# Patient Record
Sex: FEMALE | Race: WHITE | NOT HISPANIC OR LATINO | Employment: OTHER | URBAN - METROPOLITAN AREA
[De-identification: names, ages, dates, MRNs, and addresses within clinical notes are randomized per-mention and may not be internally consistent; named-entity substitution may affect disease eponyms.]

---

## 2017-01-04 ENCOUNTER — PATIENT OUTREACH (OUTPATIENT)
Dept: HEALTH INFORMATION MANAGEMENT | Facility: OTHER | Age: 82
End: 2017-01-04

## 2017-01-04 NOTE — PROGRESS NOTES
Outbound call to patient on [facility] for CHF. Alerted on BP of 154/64    Weekly follow up call completed.    Denies any chest pain, cough, SOB more than usual    Reports swelling on feet and legs, nothing more than usual. Per Peggy, she has been up on her feet for a few days now. She reports being busy cooking, washing clothes, putting away Frank decors and explained that she is used to being busy and up on her feet all the time.     Talked about salt intake and this is not something she is watching as she states she does not want her food to be bland. Educated on the importance of adhering to low salt/no added salt diet and how salt affects BP and CHF exacerbation.    Educated on managing her time:    • Plan ahead so your tasks are spaced throughout the day. As you plan, keep in mind the times of day you tend to have the most energy.  • Do only one thing at a time. Finish one task before starting another.  • Assemble everything you need before you start a task. This cuts out unnecessary steps while you’re working.  • Think about what you really need to do. Be realistic about what you can get done in a day.    Patient verbalized understanding of all education provided.

## 2017-01-05 ENCOUNTER — APPOINTMENT (OUTPATIENT)
Dept: MEDICAL GROUP | Facility: MEDICAL CENTER | Age: 82
End: 2017-01-05
Payer: MEDICARE

## 2017-01-11 ENCOUNTER — PATIENT OUTREACH (OUTPATIENT)
Dept: HEALTH INFORMATION MANAGEMENT | Facility: OTHER | Age: 82
End: 2017-01-11

## 2017-01-11 NOTE — PROGRESS NOTES
Outbound call to patient for weekly follow up. Landline phone is busy, while unable to leave message on mobile phone . Will do a follow up call tomorrow.

## 2017-01-12 ENCOUNTER — PATIENT OUTREACH (OUTPATIENT)
Dept: HEALTH INFORMATION MANAGEMENT | Facility: OTHER | Age: 82
End: 2017-01-12

## 2017-01-12 NOTE — PROGRESS NOTES
Outbound call for Remote Home Monitoring COPD; weekly follow up     Reviewed with   :           O2 use: 2 lpm at night         SOB: denies         Cough: denies         Chest pain: denies         Swelling: minimal, gets better in the morning         Dizziness: denies         Weakness/Fatigue: denies         Reviewed Medications:   Patient is using medications as directed    Patient stated she has still been cleaning up form the holiday. She also reports adding less salt to diet when cooking but cannot totally take salt off her diet     Interventions/Education:     Instructed patient to balance rest and activities, educated on energy conservation tecnhniques      Patient verbalized understanding of all education/teaching  provided

## 2017-01-18 ENCOUNTER — PATIENT OUTREACH (OUTPATIENT)
Dept: HEALTH INFORMATION MANAGEMENT | Facility: OTHER | Age: 82
End: 2017-01-18

## 2017-01-19 ENCOUNTER — PATIENT OUTREACH (OUTPATIENT)
Dept: HEALTH INFORMATION MANAGEMENT | Facility: OTHER | Age: 82
End: 2017-01-19

## 2017-01-19 NOTE — PROGRESS NOTES
Outbound call to patient on [facility]HM for CHF    Alerted on BP of 158/70    Left VM to call RN Care Coordinator back    Will try to call tomorrow if no return calls

## 2017-01-23 RX ORDER — AMLODIPINE BESYLATE 10 MG/1
TABLET ORAL
Qty: 90 TAB | Refills: 1 | Status: SHIPPED | OUTPATIENT
Start: 2017-01-23 | End: 2017-05-12

## 2017-01-25 ENCOUNTER — PATIENT OUTREACH (OUTPATIENT)
Dept: HEALTH INFORMATION MANAGEMENT | Facility: OTHER | Age: 82
End: 2017-01-25

## 2017-01-25 NOTE — PROGRESS NOTES
Outbound call to patient on [facility] for CHF. Alerted on BP of 151/71. Patient states that she was having pain this morning when she checked her biometrics.    Denies any symptoms- no chest pain, no SOB, no dizziness, no weakness.    Educated Peggy on use of her pain med medication (Norco). She reports her pain is usually in the morning but feels better after taking her medication.    Educated on non pharmacologic pain relief methods such as use of heating pads, exercise, massage, distraction.    Reviewed upcoming appointments with patient.     Verbalized understanding of all education provided.

## 2017-01-26 ENCOUNTER — PATIENT OUTREACH (OUTPATIENT)
Dept: HEALTH INFORMATION MANAGEMENT | Facility: OTHER | Age: 82
End: 2017-01-26

## 2017-01-27 NOTE — PROGRESS NOTES
Outbound call to patient on [facility] for CHF. Alerted on BP of 171/65. Patient denies any symptoms. She reports drinking v8 tomato juice more than once daily this past week. She used to get the low salt one but asked a friend to buy for her and ended up getting one that is not low salt kind.    Reviewed low salt diet with patient and explained role of salt in HTN and CHF.    Reviewed CHF action plan.    Verbalized understanding of all education provided

## 2017-01-30 ENCOUNTER — PATIENT OUTREACH (OUTPATIENT)
Dept: HEALTH INFORMATION MANAGEMENT | Facility: OTHER | Age: 82
End: 2017-01-30

## 2017-01-30 NOTE — PROGRESS NOTES
Outbound call to patient on [facility] for CHF. Alerted on BP of 152/67. Denies any other symptoms.    She was short of breath when she answered the phone and reports that she has been working around the house and just got in from putting the garbage out.    Reminded patient on her appointment with pcp tomorrow. Will send BP trend to pcp.    Educated on balancing rest and activities, resting when tired.

## 2017-01-31 ENCOUNTER — HOSPITAL ENCOUNTER (OUTPATIENT)
Dept: LAB | Facility: MEDICAL CENTER | Age: 82
End: 2017-01-31
Attending: FAMILY MEDICINE
Payer: MEDICARE

## 2017-01-31 ENCOUNTER — OFFICE VISIT (OUTPATIENT)
Dept: MEDICAL GROUP | Facility: MEDICAL CENTER | Age: 82
End: 2017-01-31
Payer: MEDICARE

## 2017-01-31 VITALS
HEIGHT: 66 IN | HEART RATE: 70 BPM | TEMPERATURE: 98.6 F | RESPIRATION RATE: 14 BRPM | DIASTOLIC BLOOD PRESSURE: 68 MMHG | SYSTOLIC BLOOD PRESSURE: 122 MMHG | BODY MASS INDEX: 20.41 KG/M2 | WEIGHT: 127 LBS | OXYGEN SATURATION: 95 %

## 2017-01-31 DIAGNOSIS — R60.0 BILATERAL LEG EDEMA: ICD-10-CM

## 2017-01-31 DIAGNOSIS — I50.22 CHRONIC SYSTOLIC CONGESTIVE HEART FAILURE (HCC): ICD-10-CM

## 2017-01-31 DIAGNOSIS — M47.819 OSTEOARTHRITIS OF SPINE WITHOUT MYELOPATHY OR RADICULOPATHY, UNSPECIFIED SPINAL REGION: ICD-10-CM

## 2017-01-31 DIAGNOSIS — I10 ESSENTIAL HYPERTENSION: ICD-10-CM

## 2017-01-31 DIAGNOSIS — E03.9 HYPOTHYROIDISM, UNSPECIFIED TYPE: ICD-10-CM

## 2017-01-31 LAB
ANION GAP SERPL CALC-SCNC: 7 MMOL/L (ref 0–11.9)
BUN SERPL-MCNC: 21 MG/DL (ref 8–22)
CALCIUM SERPL-MCNC: 9.2 MG/DL (ref 8.5–10.5)
CHLORIDE SERPL-SCNC: 103 MMOL/L (ref 96–112)
CO2 SERPL-SCNC: 28 MMOL/L (ref 20–33)
CREAT SERPL-MCNC: 0.96 MG/DL (ref 0.5–1.4)
GLUCOSE SERPL-MCNC: 112 MG/DL (ref 65–99)
POTASSIUM SERPL-SCNC: 4.3 MMOL/L (ref 3.6–5.5)
SODIUM SERPL-SCNC: 138 MMOL/L (ref 135–145)
TSH SERPL DL<=0.005 MIU/L-ACNC: 15.03 UIU/ML (ref 0.3–3.7)

## 2017-01-31 PROCEDURE — G8432 DEP SCR NOT DOC, RNG: HCPCS | Performed by: FAMILY MEDICINE

## 2017-01-31 PROCEDURE — G8482 FLU IMMUNIZE ORDER/ADMIN: HCPCS | Performed by: FAMILY MEDICINE

## 2017-01-31 PROCEDURE — 84443 ASSAY THYROID STIM HORMONE: CPT

## 2017-01-31 PROCEDURE — 1101F PT FALLS ASSESS-DOCD LE1/YR: CPT | Performed by: FAMILY MEDICINE

## 2017-01-31 PROCEDURE — 36415 COLL VENOUS BLD VENIPUNCTURE: CPT

## 2017-01-31 PROCEDURE — 80048 BASIC METABOLIC PNL TOTAL CA: CPT

## 2017-01-31 PROCEDURE — 4040F PNEUMOC VAC/ADMIN/RCVD: CPT | Mod: 8P | Performed by: FAMILY MEDICINE

## 2017-01-31 PROCEDURE — 1036F TOBACCO NON-USER: CPT | Performed by: FAMILY MEDICINE

## 2017-01-31 PROCEDURE — G8419 CALC BMI OUT NRM PARAM NOF/U: HCPCS | Performed by: FAMILY MEDICINE

## 2017-01-31 PROCEDURE — 99214 OFFICE O/P EST MOD 30 MIN: CPT | Performed by: FAMILY MEDICINE

## 2017-01-31 NOTE — PROGRESS NOTES
cc:  Leg swelling    Subjective:     Peggy Richardson is a 89 y.o. female presenting to establish care:    1. Leg swelling: has been having bilateral leg swelling for years, has been stable.  Is usually better first thing in the morning.  Denies any orthopnea, sob, cough, weight gain.  She does eat a lot of soup, likely high in salt.  She has a dx of CHF in her chart, although there are no echocardiograms. Pt is unsure if she's ever had that done.  Is possibly on amlodipine 10mg daily, her home med list does not list it but in the chart, a recent prescription was sent on 1/23/2017.  Has hypothyroidism, on synthroid 112 daily, last TSH was 1.8 two years ago.  She reports having a right leg wound that was taking a long time to heal, has now scabbed over and seems to be improving. Is taking lasix 20mg daily with 8meq KCl daily.  Last bmp with creat 1.1 and gfr 46 on 7/2016.    2. HTN: is on lisinopril 7.5mg daily, atenolol 50mg BID, possibly amlodipine 10mg daily.  Denies any chest pain, shortness of breath, leg swelling, jaw claudication.  Occasionally gets lightheaded if she moves too quickly.    3. Arthritis: has arthritis in her back, neck, knees, hands.  Pain has been well controlled on mobic 7.5mg every morning with breakfast, and norco 7.5 TID.  Denies any constipation.    Review of systems:  See above.          Current outpatient prescriptions:   •  amlodipine (NORVASC) 10 MG Tab, TAKE 1 TABLET DAILY, Disp: 90 Tab, Rfl: 1  •  Potassium Chloride CR (MICRO-K) 8 MEQ Cap CR capsule, TAKE 1 CAPSULE BY MOUTH DAILY *GENERIC FOR MICRO-K*, Disp: 60 Cap, Rfl: 2  •  hydrocodone-acetaminophen (NORCO) 7.5-325 MG per tablet, Take 1 Tab by mouth every 8 hours as needed (severe pain)., Disp: 100 Tab, Rfl: 0  •  atenolol (TENORMIN) 50 MG Tab, TAKE 1 TABLET TWICE A DAY, Disp: 180 Tab, Rfl: 0  •  levothyroxine (SYNTHROID) 112 MCG Tab, TAKE 1 TABLET EVERY MORNING ON AN EMPTY STOMACH, Disp: 90 Tab, Rfl: 0  •  DETROL LA 4 MG CAPSULE SR  "24 HR, TAKE 1 CAPSULE DAILY, Disp: 90 Cap, Rfl: 0  •  meloxicam (MOBIC) 7.5 MG Tab, Take 1 Tab by mouth every day., Disp: 90 Tab, Rfl: 1  •  lisinopril (PRINIVIL) 20 MG Tab, Take 1 Tab by mouth every day., Disp: 90 Tab, Rfl: 1  •  Cyanocobalamin (B-12) 1000 MCG Cap, Take  by mouth., Disp: , Rfl:   •  furosemide (LASIX) 20 MG Tab, Take 1 Tab by mouth every day., Disp: 90 Tab, Rfl: 1  •  Calcium Carbonate-Vitamin D (CALTRATE 600+D) 600-400 MG-UNIT TABS, Take 1 Tab by mouth 2 times a day., Disp: 180 Each, Rfl: 1    Allergies   Allergen Reactions   • Fosamax      GI upset       Past Medical History   Diagnosis Date   • HTN (hypertension), benign    • Incontinence    • PVD (peripheral vascular disease) (CMS-[facility])    • Diabetes    • DJD (degenerative joint disease)    • GERD (gastroesophageal reflux disease)    • Medical home      Past Surgical History   Procedure Laterality Date   • Tonsillectomy     • Gastrectomy     • Gastrostomy tube change     • Gastrectomy       partial   • Cholecystectomy       Family History   Problem Relation Age of Onset   • Cancer Mother      Social History     Social History   • Marital Status:      Spouse Name: N/A   • Number of Children: N/A   • Years of Education: N/A     Occupational History   • Not on file.     Social History Main Topics   • Smoking status: Former Smoker -- 1.50 packs/day     Types: Cigarettes     Quit date: 07/21/1971   • Smokeless tobacco: Former User      Comment: 35 yrs ago   • Alcohol Use: No   • Drug Use: No   • Sexual Activity: Not Currently     Other Topics Concern   • Not on file     Social History Narrative       Objective:     Vitals: /68 mmHg  Pulse 70  Temp(Src) 37 °C (98.6 °F)  Resp 14  Ht 1.676 m (5' 6\")  Wt 57.607 kg (127 lb)  BMI 20.51 kg/m2  SpO2 95%  General: Alert, pleasant, NAD  HEENT: Normocephalic.  Neck supple.  No thyromegaly or masses palpated. No cervical or supraclavicular lymphadenopathy.  Heart: Regular rate and rhythm.  S1 " and S2 normal.  Grade 3/6 systolic and possibly early diastolic murmur  Respiratory: Normal respiratory effort.  Clear to auscultation bilaterally.    Abdomen: Non-distended, soft  Skin: Warm, dry, no rashes.  Musculoskeletal: Gait slow, uses a rolling walker  Extremities: bilateral pitting edema to knees.  Eschar on right lateral leg. No erythema.  Psych:  Affect/mood is normal, judgement is good, memory is intact, grooming is appropriate.    Assessment/Plan:     Peggy was seen today for hypertension.    Diagnoses and all orders for this visit:    Bilateral leg edema  Chronic systolic congestive heart failure (CMS-HCC)  CHF is listed in her chart but unable to find a recent echo.  Will order that.  Continue with lasix.  Advised to cut down on salt use.  If she is taking amlodipine, would recommend that she stop that.  Will also check tsh to make sure that is stable.  She will bring in all her medicine bottles at her next visit to review  -     ECHOCARDIOGRAM COMP W/O CONT; Future    Hypothyroidism, unspecified type  Possibly stable, continue synthroid for now, recheck tsh.  -     TSH; Future    Essential hypertension  Possibly over treating with amlodipine, lisinopril, lasix.  Will stop the amlodipine if she is actually taking that, she will bring in all her med bottles at next visit.  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  Will recheck kidney function, consider stopping nsaids.      Return in about 2 months (around 3/31/2017) for Med check.

## 2017-01-31 NOTE — MR AVS SNAPSHOT
"        Peggy Richardson   2017 2:20 PM   Office Visit   MRN: [id number]    Department:  [address] [place] [address]   Dept Phone:  [phone]    Description:  Female : 1921   Provider:  Dakotah Alcala M.D.           Reason for Visit     Hypertension           Allergies as of 2017     Allergen Noted Reactions    Fosamax 2012       GI upset      You were diagnosed with     Bilateral leg edema   [127244]       Chronic systolic congestive heart failure (CMS-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   [facility] (--)    [address] [place] [id number] [phone]              Problem List              ICD-10-CM Priority Class Noted - Resolved    Incontinence R32   Unknown - Present    DJD (degenerative joint disease) M19.90   Unknown - Present    PVD (peripheral vascular disease) (CMS-[facility]) I73.9   Unknown - Present    OSTEOPOROSIS    2009 - Present    Hypothyroidism E03.9   2010 - Present    Vitamin B12 deficiency E53.8   5/15/2012 - Present    Medical home Z78.9   2016 - Present    Essential hypertension I10   2017 - Present    Chronic systolic congestive heart failure (CMS-HCC) I50.22   2017 - Present    Bilateral leg edema R60.0   2017 - Present      Health Maintenance        Date Due Completion Dates    IMM " DTaP/Tdap/Td Vaccine (1 - Tdap) [birth year]/8/1940 ---    IMM ZOSTER VACCINE 12/8/1981 ---    IMM PNEUMOCOCCAL 65+ (ADULT) LOW/MEDIUM RISK SERIES (1 of 2 - PCV13) 12/8/1986 ---    BONE DENSITY 6/10/2019 6/10/2014 (Declined), 4/23/2014 (Postponed), 4/22/2008, 4/22/2008    Override on 6/10/2014: Patient Declined    Override on 4/23/2014: Postponed    COLONOSCOPY 12/11/2022 12/11/2012 (Declined)    Override on 12/11/2012: Patient Declined            Current Immunizations     Influenza Vaccine Adult HD 11/19/2016, 10/26/2015  4:28 PM, 10/15/2014, 9/24/2013    Influenza Vaccine Pediatric 12/21/2009    Pneumococcal Vaccine (UF)Historical Data 10/1/2007      Below and/or attached are the medications your provider expects you to take. Review all of your home medications and newly ordered medications with your provider and/or pharmacist. Follow medication instructions as directed by your provider and/or pharmacist. Please keep your medication list with you and share with your provider. Update the information when medications are discontinued, doses are changed, or new medications (including over-the-counter products) are added; and carry medication information at all times in the event of emergency situations     Allergies:  FOSAMAX - (reactions not documented)               Medications  Valid as of: January 31, 2017 -  3:29 PM    Generic Name Brand Name Tablet Size Instructions for use    AmLODIPine Besylate (Tab) NORVASC 10 MG TAKE 1 TABLET DAILY        Atenolol (Tab) TENORMIN 50 MG TAKE 1 TABLET TWICE A DAY        Calcium Carbonate-Vitamin D (Tab) Calcium Carbonate-Vitamin D 600-400 MG-UNIT Take 1 Tab by mouth 2 times a day.        Cyanocobalamin (Cap) B-12 1000 MCG Take  by mouth.        Furosemide (Tab) LASIX 20 MG Take 1 Tab by mouth every day.        Hydrocodone-Acetaminophen (Tab) NORCO 7.5-325 MG Take 1 Tab by mouth every 8 hours as needed (severe pain).        Levothyroxine Sodium (Tab) SYNTHROID 112 MCG TAKE 1 TABLET EVERY  MORNING ON AN EMPTY STOMACH        Lisinopril (Tab) PRINIVIL 20 MG Take 1 Tab by mouth every day.        Meloxicam (Tab) MOBIC 7.5 MG Take 1 Tab by mouth every day.        Potassium Chloride (Cap CR) MICRO-K 8 MEQ TAKE 1 CAPSULE BY MOUTH DAILY ***GENERIC FOR MICRO-K*        Tolterodine Tartrate (CAPSULE SR 24 HR) DETROL LA 4 MG TAKE 1 CAPSULE DAILY        .                 Medicines prescribed today were sent to:     [id number] [place] - [address] [facility]    [address] [place] [address] [place] [address]    Phone: [phone] Fax: [phone]    Open 24 Hours?: No      Medication refill instructions:       If your prescription bottle indicates you have medication refills left, it is not necessary to call your provider’s office. Please contact your pharmacy and they will refill your medication.    If your prescription bottle indicates you do not have any refills left, you may request refills at any time through one of the following ways: The online [employer] system (except Urgent Care), by calling your provider’s office, or by asking your pharmacy to contact your provider’s office with a refill request. Medication refills are processed only during regular business hours and may not be available until the next business day. Your provider may request additional information or to have a follow-up visit with you prior to refilling your medication.   *Please Note: Medication refills are assigned a new Rx number when refilled electronically. Your pharmacy may indicate that no refills were authorized even though a new prescription for the same medication is available at the pharmacy. Please request the medicine by name with the pharmacy before contacting your provider for a refill.        Your To Do List     Future Labs/Procedures Complete By Expires    BASIC METABOLIC PANEL  As directed 1/31/2018    ECHOCARDIOGRAM COMP W/O CONT  As directed 1/31/2018    TSH  As directed 1/31/2018      Instructions    Please bring all your  medication bottles at your next visit       Other Notes About Your Plan     Patient is enrolled in [facility] with Dr. Skinner.    7/19/16--Enrolled in Remote Telemonitoring Program for CHF           MyChart Status: Patient Declined

## 2017-02-01 DIAGNOSIS — E03.9 HYPOTHYROIDISM, UNSPECIFIED TYPE: ICD-10-CM

## 2017-02-01 NOTE — TELEPHONE ENCOUNTER
Can you call pt and let her know that her thyroid medication needs to be changed based on her blood work and that i need to know where to send the new prescription ([facility]?).  She also needs blood work in 2 months and an appointment.  thanks

## 2017-02-01 NOTE — TELEPHONE ENCOUNTER
[facility]  And she was informed about  Lab to be done in 2 months

## 2017-02-02 RX ORDER — LEVOTHYROXINE SODIUM 0.12 MG/1
125 TABLET ORAL
Qty: 90 TAB | Refills: 0 | Status: SHIPPED | OUTPATIENT
Start: 2017-02-02 | End: 2017-04-13 | Stop reason: SDUPTHER

## 2017-02-02 RX ORDER — LEVOTHYROXINE SODIUM 0.12 MG/1
125 TABLET ORAL
Qty: 90 TAB | Refills: 0 | Status: SHIPPED | OUTPATIENT
Start: 2017-02-02 | End: 2017-02-02 | Stop reason: SDUPTHER

## 2017-02-03 ENCOUNTER — PATIENT OUTREACH (OUTPATIENT)
Dept: HEALTH INFORMATION MANAGEMENT | Facility: OTHER | Age: 82
End: 2017-02-03

## 2017-02-09 ENCOUNTER — PATIENT OUTREACH (OUTPATIENT)
Dept: HEALTH INFORMATION MANAGEMENT | Facility: OTHER | Age: 82
End: 2017-02-09

## 2017-02-09 NOTE — PROGRESS NOTES
Outbound call to patient on [facility] for CHF, weekly follow up.Biometrics have been good in the past 9 days            Reviewed with  patient :           O2 use: 2 lpm via nc at night         SOB: denies         Cough: denies         Chest pain: denies         Swelling: on legs, nothing more than usual         Dizziness: denies         Weakness/Fatigue: denies         Reviewed Medications:   Patient is using medications as directed    Interventions/Education:     Reviewed changes in her medication  Reeducated on the importance of low salt intake. Recommended other options.  Will continue to monitor.      Patient verbalized understanding of all education/teaching  provided

## 2017-02-13 RX ORDER — FUROSEMIDE 20 MG/1
TABLET ORAL
OUTPATIENT
Start: 2017-02-13

## 2017-02-13 RX ORDER — FUROSEMIDE 20 MG/1
20 TABLET ORAL DAILY
Qty: 90 TAB | Refills: 0 | Status: SHIPPED | OUTPATIENT
Start: 2017-02-13

## 2017-02-16 ENCOUNTER — PATIENT OUTREACH (OUTPATIENT)
Dept: HEALTH INFORMATION MANAGEMENT | Facility: OTHER | Age: 82
End: 2017-02-16

## 2017-02-16 NOTE — PROGRESS NOTES
Outbound call to patient on [facility] for COPD. Alerted on weight gain of 3 lbs in a day. Patient states she did not gain weight but was just wearing heavier clothes. She states further that she has been weighing on her zachary scale and so far no changes. Emphasized the importance of checking weigh at consistent times,naked or wearing light clothe without shoes on and having the scale on a flat surface.Denies any other symptoms.    Patient reeducated on salt intake and she reports that for the first time last night she did not add any salt to her food and she thought it tasted fine. Emphasized the role of salt on HTN and CHF.    Verbalized understanding.

## 2017-02-17 ENCOUNTER — PATIENT OUTREACH (OUTPATIENT)
Dept: HEALTH INFORMATION MANAGEMENT | Facility: OTHER | Age: 82
End: 2017-02-17

## 2017-02-17 NOTE — PROGRESS NOTES
REMOTE HOME TELE MONITORING FOR CHF  Alerted: on BP of 153/74    Reviewed biometrics and the BP is trending within patients normal ranges.  Refer to last “CC” progress note on 2/13/17

## 2017-02-24 ENCOUNTER — PATIENT OUTREACH (OUTPATIENT)
Dept: HEALTH INFORMATION MANAGEMENT | Facility: OTHER | Age: 82
End: 2017-02-24

## 2017-02-24 NOTE — PROGRESS NOTES
Outbound call to patient on [facility] for CHF. Alerted on BP of 163/73. Patient denies any symptoms. She reports having pain from arthritis causing her BP to be up. Reviewed pain management, options for non pharmacologic pain relief methods. Reviewed salt intake with patient, she reports this to be better but sometimes it is difficult as she loves cooking and is used to adding salt in food. Educated on the role of salt in HTN and CHF. Verbalized understanding of education provided.

## 2017-02-27 ENCOUNTER — PATIENT OUTREACH (OUTPATIENT)
Dept: HEALTH INFORMATION MANAGEMENT | Facility: OTHER | Age: 82
End: 2017-02-27

## 2017-02-27 NOTE — PROGRESS NOTES
REMOTE HOME TELE MONITORING FOR CHF  Alerted: on BP of 157/75    Reviewed biometrics and the BP is trending within patients normal ranges.

## 2017-03-01 ENCOUNTER — PATIENT OUTREACH (OUTPATIENT)
Dept: HEALTH INFORMATION MANAGEMENT | Facility: OTHER | Age: 82
End: 2017-03-01

## 2017-03-01 NOTE — PROGRESS NOTES
REMOTE HOME TELE MONITORING FOR CHF  Alerted: on BP of 151/78    Reviewed biometrics and the BP is trending within patients normal ranges.

## 2017-03-02 ENCOUNTER — PATIENT OUTREACH (OUTPATIENT)
Dept: HEALTH INFORMATION MANAGEMENT | Facility: OTHER | Age: 82
End: 2017-03-02

## 2017-03-02 NOTE — PROGRESS NOTES
Outbound call to patient on [facility] for CHF. Alerted on BP of 171/75. Patient reports feeling dizzy, weak  and tired the past week and she thinks it was due to her increased levothyroxine. Yesterday she started taking the old, lower dose (112) and reports feeling better today  Patient reports having nasal congestion for a few days now, started using her nasal sprays which she reports to be working. She also increased her oxygen to 3 lpm from 2 lpm    Patient reports less swelling on feet and legs. She denies any chest pain, cough, shortness of breath. Recommended patient balance rest and activities.    IB message sent to PCP.    Will continue to monitor

## 2017-03-07 ENCOUNTER — PATIENT OUTREACH (OUTPATIENT)
Dept: HEALTH INFORMATION MANAGEMENT | Facility: OTHER | Age: 82
End: 2017-03-07

## 2017-03-07 DIAGNOSIS — E03.9 HYPOTHYROIDISM, UNSPECIFIED TYPE: ICD-10-CM

## 2017-03-07 DIAGNOSIS — I10 ESSENTIAL HYPERTENSION: ICD-10-CM

## 2017-03-07 DIAGNOSIS — R60.0 BILATERAL LEG EDEMA: ICD-10-CM

## 2017-03-07 NOTE — PROGRESS NOTES
Outbound call to patient on [facility] for CHF, weekly follow up call.  Patient reports that she was admitted to [facility] over the weekend due to a fall    Patient reported that he slipped in the bathroom as she was not wearing her slippers. She landed on the bathtub, pulled the curtain which fell on her. She reports no injury found on xray. [facility] home health was ordered.    Patient educated on fall prevention and safety precaution. Recommended using a bedside  Commode.    Verbalized understanding.    Will continue to monitor

## 2017-03-08 ENCOUNTER — PATIENT OUTREACH (OUTPATIENT)
Dept: HEALTH INFORMATION MANAGEMENT | Facility: OTHER | Age: 82
End: 2017-03-08

## 2017-03-08 NOTE — PROGRESS NOTES
REMOTE HOME TELE MONITORING FOR COPD  Alerted: on BP of 158/66    Reviewed biometrics and the BP is trending within patients normal ranges.  Refer to last “CC” progress note on 3/7/17

## 2017-03-09 ENCOUNTER — PATIENT OUTREACH (OUTPATIENT)
Dept: HEALTH INFORMATION MANAGEMENT | Facility: OTHER | Age: 82
End: 2017-03-09

## 2017-03-09 NOTE — PROGRESS NOTES
REMOTE HOME TELE MONITORING FOR COPD  Alerted: on BP of 151/62    Reviewed biometrics and the BP is trending within patients normal ranges.

## 2017-03-10 ENCOUNTER — OFFICE VISIT (OUTPATIENT)
Dept: MEDICAL GROUP | Facility: MEDICAL CENTER | Age: 82
End: 2017-03-10
Payer: MEDICARE

## 2017-03-10 VITALS
HEART RATE: 69 BPM | SYSTOLIC BLOOD PRESSURE: 128 MMHG | WEIGHT: 127 LBS | HEIGHT: 66 IN | TEMPERATURE: 97.8 F | OXYGEN SATURATION: 98 % | RESPIRATION RATE: 16 BRPM | DIASTOLIC BLOOD PRESSURE: 74 MMHG | BODY MASS INDEX: 20.41 KG/M2

## 2017-03-10 DIAGNOSIS — E03.9 HYPOTHYROIDISM, UNSPECIFIED TYPE: ICD-10-CM

## 2017-03-10 DIAGNOSIS — I10 ESSENTIAL HYPERTENSION: ICD-10-CM

## 2017-03-10 DIAGNOSIS — E87.6 HYPOKALEMIA: ICD-10-CM
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
## 2017-03-10 DIAGNOSIS — E53.8 VITAMIN B12 DEFICIENCY: ICD-10-CM
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
## 2017-03-10 DIAGNOSIS — M47.819 OSTEOARTHRITIS OF SPINE WITHOUT MYELOPATHY OR RADICULOPATHY, UNSPECIFIED SPINAL REGION: ICD-10-CM

## 2017-03-10 PROCEDURE — 1036F TOBACCO NON-USER: CPT | Performed by: NURSE PRACTITIONER

## 2017-03-10 PROCEDURE — G8482 FLU IMMUNIZE ORDER/ADMIN: HCPCS | Performed by: NURSE PRACTITIONER

## 2017-03-10 PROCEDURE — G8419 CALC BMI OUT NRM PARAM NOF/U: HCPCS | Performed by: NURSE PRACTITIONER

## 2017-03-10 PROCEDURE — 1101F PT FALLS ASSESS-DOCD LE1/YR: CPT | Performed by: NURSE PRACTITIONER

## 2017-03-10 PROCEDURE — 4040F PNEUMOC VAC/ADMIN/RCVD: CPT | Mod: 8P | Performed by: NURSE PRACTITIONER

## 2017-03-10 PROCEDURE — 99214 OFFICE O/P EST MOD 30 MIN: CPT | Mod: 25 | Performed by: NURSE PRACTITIONER

## 2017-03-10 ASSESSMENT — ENCOUNTER SYMPTOMS: BACK PAIN: 1

## 2017-03-10 NOTE — PROGRESS NOTES
Subjective:      Peggy Richardson is a 89 y.o. female who presents with Hospital Follow-up            HPI Peggy Richardson is a patient of Dr. Skinner who just recently established with  here today for hospital follow-up.        1. Fall at home, subsequent encounter  Patient reports that about 5 days ago she was walking to her bathroom when she slipped and fell backwards into her bathtub. She went to [facility] emergency room and states she was admitted for 3 days. She reports that no fractures were found and no medication changes were made. We requested hospital records earlier today but they still were not available as of the time of the visit or dictation.    Patient states she believes she was kept because of the pain and was given pain management. She does take Norco 3-4 times a day for pain control. She states the pain has improved and as mentioned above, she is not on any new medications. She does not have any records with her. She states she does feel weak most of the time but does not normally fall at home. She denies dysuria, chest pain, shortness of breath or increasing lower extremity edema which she reports has improved since hospitalization.    2. Hypothyroidism, unspecified type  Patient has history of hypothyroidism and her dosage of levothyroxine was increased by  in January because of elevated TSH levels. Patient states she is taking her medication faithfully and will be due for lab work.    3. Hypokalemia  Patient on low-dose potassium supplementation and her last potassium level for this office was normal. Patient does report something about getting potassium supplementation in the hospital.    4. Vitamin B12 deficiency  Patient has history of B12 deficiency and is due for lab work.    5. Essential hypertension  Blood pressure is controlled on amlodipine and atenolol.    6. 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 She states she has neighbors who are on the 10 mg dosage and wonders if she can switch to this. She typically gets #100 pills per month.    Social History   Substance Use Topics   • Smoking status: Former Smoker -- 1.50 packs/day     Types: Cigarettes     Quit date: 07/21/1971   • Smokeless tobacco: Former User      Comment: 35 yrs ago   • Alcohol Use: No       Family History   Problem Relation Age of Onset   • Cancer Mother      Past Medical History   Diagnosis Date   • HTN (hypertension), benign    • Incontinence    • PVD (peripheral vascular disease) (CMS-[facility])    • Diabetes    • DJD (degenerative joint disease)    • GERD (gastroesophageal reflux disease)    • Medical home        Review of Systems   Constitutional: Positive for malaise/fatigue.   Musculoskeletal: Positive for back pain and joint pain.   All other systems reviewed and are negative.         Objective:     /74 mmHg  Pulse 69  Temp(Src) 36.6 °C (97.8 °F)  Resp 16  Ht 1.676 m (5' 5.98\")  Wt 57.607 kg (127 lb)  BMI 20.51 kg/m2  SpO2 98%     Physical Exam   Constitutional: She is oriented to person, place, and time. She appears well-developed and well-nourished. No distress.   HENT:   Head: Normocephalic and atraumatic.   Right Ear: External ear normal.   Left Ear: External ear normal.   Nose: Nose normal.   Eyes: Right eye exhibits no discharge. Left eye exhibits no discharge.   Neck: Normal range of motion. Neck supple. No thyromegaly present.   Cardiovascular: Normal rate and regular rhythm.  Exam reveals no gallop and no friction rub.    Murmur heard.  Pulmonary/Chest: Effort normal and breath sounds normal. She has no wheezes. She has no rales.   Musculoskeletal: She exhibits no edema or tenderness.   Generalized weakness and she uses a walker for mobility and wheelchair for longer distance mobility.   Neurological: She is alert and oriented to person, place, and time. She displays normal reflexes.   Skin: " Skin is warm and dry. No rash noted. She is not diaphoretic.   Psychiatric: She has a normal mood and affect. Her behavior is normal. Judgment and thought content normal.   Nursing note and vitals reviewed.              Assessment/Plan:     1. Fall at home, subsequent encounter  We have requested records from [facility] but they were not available at the time of dictation. Patient apparently did not suffer a fracture and she brings with her her medications today which showed no changes from what she receives from this office. I spoke with her about safety protocol and she does have family members that help. She has a walker to use. She is wanting higher dose of narcotics but I declined changes today.    2. Hypothyroidism, unspecified type  Patient's TSH was high prior to her change in medication. I have given her TSH slip to do lab work before her next visit with her PCP.  - TSH; Future    3. Hypokalemia  She will do lab work to check if she has electrolyte imbalances and is currently on low-dose potassium.  - COMP METABOLIC PANEL; Future    4. Vitamin B12 deficiency  Patient has history of B12 deficiency.  - VITAMIN B12; Future    5. Essential hypertension  She shows no hypotension in the office and continues with her amlodipine and atenolol. She is also on Lasix and her lower extremity edema has improved post hospitalization.    6. 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

## 2017-03-13 ENCOUNTER — PATIENT OUTREACH (OUTPATIENT)
Dept: HEALTH INFORMATION MANAGEMENT | Facility: OTHER | Age: 82
End: 2017-03-13

## 2017-03-14 ENCOUNTER — PATIENT OUTREACH (OUTPATIENT)
Dept: HEALTH INFORMATION MANAGEMENT | Facility: OTHER | Age: 82
End: 2017-03-14

## 2017-03-14 RX ORDER — TOLTERODINE TARTRATE 4 MG
CAPSULE, EXT RELEASE 24 HR ORAL
Qty: 90 CAP | Refills: 1 | Status: SHIPPED | OUTPATIENT
Start: 2017-03-14

## 2017-03-14 NOTE — PROGRESS NOTES
"Care Coordination:    Outbound call to patient     RHM alert: weight increase    Pt states \"Yeah I've been trying!\"  \"I got down to 113 while I was in the hospital\".  \"I've been eating, I like to eat\".    Pt states she has mild feet and ankle swelling but it is greatly reduced compared to prior.     Pt states her toenails have gotten quite long and it makes it painful to walk, pt states she will schedule an appt with her podiatrist.    Pt states her pain from recent fall is \"about 60% gone\".    Plan:    Continue to monitor RHM  Pts primary CC RN to follow      "

## 2017-03-16 ENCOUNTER — PATIENT OUTREACH (OUTPATIENT)
Dept: HEALTH INFORMATION MANAGEMENT | Facility: OTHER | Age: 82
End: 2017-03-16

## 2017-03-16 NOTE — PROGRESS NOTES
Outbound call to patient on RHM for CHF. Alerted on weight gain  Of 2 lbs. Patient reports trying to gain weight since she lost weight in the hospital. Educated on low salt low food diet. Patient states she has been eating canned foods and gravies, and vegetables. Explained that gravy has high salt intake as well as canned goods. Educated on how to read the labels.    Denies any chest pains, shortness of breath. She reports swelling and feet and legs due to being on her feet for a while. Recommended resting and elevating legs to improve swelling.    Will continue to monitor.

## 2017-03-20 RX ORDER — PIOGLITAZONEHYDROCHLORIDE 15 MG/1
TABLET ORAL
Qty: 90 TAB | Refills: 1 | Status: SHIPPED | OUTPATIENT
Start: 2017-03-20 | End: 2017-05-12

## 2017-03-20 RX ORDER — ATENOLOL 50 MG/1
TABLET ORAL
Qty: 180 TAB | Refills: 1 | Status: SHIPPED | OUTPATIENT
Start: 2017-03-20 | End: 2017-05-12

## 2017-03-21 ENCOUNTER — PATIENT OUTREACH (OUTPATIENT)
Dept: HEALTH INFORMATION MANAGEMENT | Facility: OTHER | Age: 82
End: 2017-03-21

## 2017-03-21 NOTE — PROGRESS NOTES
Outbound call to patient on RHM for CHF. Alerted on question: Are you experiencing more difficulty breathing today, compared to a normal day? (With Educational Content) Yes    Patient reports feeling like she does not have the energy today. Denies any chest pain, shortness of breath, swelling on feet legs and hands    Reviewed medication and she is taking these appropriately    Instructed patient to balance rest and activities, rest when tired.    Patient knows when to go to the ER

## 2017-03-22 ENCOUNTER — PATIENT OUTREACH (OUTPATIENT)
Dept: HEALTH INFORMATION MANAGEMENT | Facility: OTHER | Age: 82
End: 2017-03-22

## 2017-03-22 NOTE — PROGRESS NOTES
Outbound call to patient on RHM for CHF. Alerted on weight gain of 2.5 lbs. Patient reports wearing heavier clothes today and eating a lot prior to checking her weight. Denies any chest pain, SOB more than usual or swelling more than usual.     Educated on the importance of weighing at same hours during the day, naked and placing her scale on a flat surface.    Reviewed salt intake, denied adding more salt to diet.    Will continue to monitor.

## 2017-03-27 ENCOUNTER — PATIENT OUTREACH (OUTPATIENT)
Dept: HEALTH INFORMATION MANAGEMENT | Facility: OTHER | Age: 82
End: 2017-03-27

## 2017-03-27 NOTE — PROGRESS NOTES
Outbound call to patient on RHM for CHF    Alerted on empty packet    Left VM to call RN Care Coordinator back    Will try to call 3/29/17 if no return calls

## 2017-03-28 ENCOUNTER — PATIENT OUTREACH (OUTPATIENT)
Dept: HEALTH INFORMATION MANAGEMENT | Facility: OTHER | Age: 82
End: 2017-03-28

## 2017-03-28 NOTE — PROGRESS NOTES
Outbound call to patient on RHM for CHF. Alerted on empty packet for 2 days now. Called and left message on home and mobile phones. Called [facility] and verified that patient is admitted there. Patient reports that she was admitted yesterday but could not remember what she went in for.    Will continue to monitor.

## 2017-04-03 ENCOUNTER — PATIENT OUTREACH (OUTPATIENT)
Dept: HEALTH INFORMATION MANAGEMENT | Facility: OTHER | Age: 82
End: 2017-04-03

## 2017-04-03 NOTE — PROGRESS NOTES
Outbound call to patient on RHM for CHF. Alerted on empty packet. Left VM for patient to call back. Called [facility] and patient is no longer there.Spoke with emergency contact Haleigh who reports that patient is currently at [facility].    [facility] placed on hold    Will continue to monitor

## 2017-04-12 ENCOUNTER — PATIENT OUTREACH (OUTPATIENT)
Dept: HEALTH INFORMATION MANAGEMENT | Facility: OTHER | Age: 82
End: 2017-04-12

## 2017-04-12 NOTE — PROGRESS NOTES
Outbound call to patient and left VM to call back. Called emergency contact and left  as well.  Called [facility] and [facility] is still there. Asked if someone can pack the equipment and send it back to [facility], patient states she does not have anyone. Patient states she might be discharging this Friday. Will continue to monitor.

## 2017-04-18 ENCOUNTER — TELEPHONE (OUTPATIENT)
Dept: MEDICAL GROUP | Facility: MEDICAL CENTER | Age: 82
End: 2017-04-18

## 2017-04-18 ENCOUNTER — PATIENT OUTREACH (OUTPATIENT)
Dept: HEALTH INFORMATION MANAGEMENT | Facility: OTHER | Age: 82
End: 2017-04-18

## 2017-04-18 NOTE — PROGRESS NOTES
Spoke with Peggy to review the following:    Patient was recently admitted to [facility] and was discharged from home Friday 4/14/17. Patient will be discharged from [facility] but will be kept on CCM program    Peggy reports that she is compliant with medications and diet.     Peggy will still need education on how to identify yellow and red symptoms and the necessary interventions and provided teach back on causes, symptoms, home care instructions, when to seek medical care versus immediate medical care for CHF management; coping with heart failure- ways to feel better, asking for help    Peggy will  be able to give examples of low sodium diet    Graduation Plan: Transition to a lower level monitoring with self-reporting to Dr. Alcala.    Confirmed that Peggy has the Care Coordinator's  contact information:  Rebecca [phone] and Dr Alcala contact number:  [phone].    Provided Peggy with the [facility] Nurse Hotline number:  [phone].  An RN is available 24-hours day, 7 days a week to answer medical and health questions you might have about your symptoms, medications, allergies or other conditions.    NORMAN notified Mary to contact the patient to provide instructions on returning the equipment used for the COPD Amb [facility] Home Remote Tele-monitoring program.

## 2017-04-24 ENCOUNTER — OFFICE VISIT (OUTPATIENT)
Dept: MEDICAL GROUP | Facility: MEDICAL CENTER | Age: 82
End: 2017-04-24
Payer: MEDICARE

## 2017-04-24 VITALS
BODY MASS INDEX: 21.16 KG/M2 | WEIGHT: 127 LBS | RESPIRATION RATE: 16 BRPM | DIASTOLIC BLOOD PRESSURE: 72 MMHG | SYSTOLIC BLOOD PRESSURE: 144 MMHG | HEART RATE: 67 BPM | TEMPERATURE: 98.2 F | OXYGEN SATURATION: 98 % | HEIGHT: 65 IN

## 2017-04-24 DIAGNOSIS — E03.4 HYPOTHYROIDISM DUE TO ACQUIRED ATROPHY OF THYROID: ICD-10-CM

## 2017-04-24 DIAGNOSIS — R60.0 BILATERAL LEG EDEMA: ICD-10-CM

## 2017-04-24 DIAGNOSIS — I10 ESSENTIAL HYPERTENSION: ICD-10-CM

## 2017-04-24 DIAGNOSIS — I50.22 CHRONIC SYSTOLIC CONGESTIVE HEART FAILURE (HCC): ICD-10-CM

## 2017-04-24 PROCEDURE — G8420 CALC BMI NORM PARAMETERS: HCPCS | Performed by: NURSE PRACTITIONER

## 2017-04-24 PROCEDURE — 4040F PNEUMOC VAC/ADMIN/RCVD: CPT | Mod: 8P | Performed by: NURSE PRACTITIONER

## 2017-04-24 PROCEDURE — 99213 OFFICE O/P EST LOW 20 MIN: CPT | Performed by: NURSE PRACTITIONER

## 2017-04-24 PROCEDURE — 1036F TOBACCO NON-USER: CPT | Performed by: NURSE PRACTITIONER

## 2017-04-24 PROCEDURE — 1101F PT FALLS ASSESS-DOCD LE1/YR: CPT | Performed by: NURSE PRACTITIONER

## 2017-04-24 PROCEDURE — G8432 DEP SCR NOT DOC, RNG: HCPCS | Performed by: NURSE PRACTITIONER

## 2017-04-24 NOTE — PROGRESS NOTES
Subjective:      Peggy Richardson is a 89 y.o. female who presents with Hospital Follow-up            HPI Peggy Richardson is a prior patient of Dr. Skinner here today for hospital follow-up?.      1. Chronic systolic congestive heart failure (CMS-HCC)  Patient reports that she recently got of rehabilitation 3 days ago and apparently was admitted there from [facility]. We do have notes from her hospitalization in March where she was seen for fatigue but we do not have any recent records. She states also that home health is going to see her but she does not know why she was in the hospital or what medication she is currently on. She complains of fatigue but no increase in swelling or shortness of breath. Pulse ox in the office is 98%. She is very hard of hearing.    2. Bilateral leg edema  She believes she is still taking her diuretic but she did not bring her medicines with her.    3. Hypothyroidism due to acquired atrophy of thyroid  Patient's last TSH from this office was elevated and levothyroxine dosage was increased. It is not clear if patient is taking this.    4. Essential hypertension  Blood pressure appears well controlled in the office on current medicines.      Social History   Substance Use Topics   • Smoking status: Former Smoker -- 1.50 packs/day     Types: Cigarettes     Quit date: 07/21/1971   • Smokeless tobacco: Former User      Comment: 35 yrs ago   • Alcohol Use: No     Past Medical History   Diagnosis Date   • HTN (hypertension), benign    • Incontinence    • PVD (peripheral vascular disease) (CMS-HCC)    • Diabetes    • DJD (degenerative joint disease)    • GERD (gastroesophageal reflux disease)    • Medical home      Family History   Problem Relation Age of Onset   • Cancer Mother        Review of Systems   Constitutional: Positive for malaise/fatigue.   Cardiovascular: Positive for leg swelling.   All other systems reviewed and are negative.         Objective:     /72 mmHg   "Pulse 67  Temp(Src) 36.8 °C (98.2 °F)  Resp 16  Ht 1.651 m (5' 5\")  Wt 57.607 kg (127 lb)  BMI 21.13 kg/m2  SpO2 98%     Physical Exam   Constitutional: She is oriented to person, place, and time. She appears well-developed and well-nourished. No distress.   HENT:   Head: Normocephalic and atraumatic.   Right Ear: External ear normal. Decreased hearing is noted.   Left Ear: External ear normal. Decreased hearing is noted.   Nose: Nose normal.   Eyes: Right eye exhibits no discharge. Left eye exhibits no discharge.   Neck: Normal range of motion. Neck supple. No thyromegaly present.   Cardiovascular: Normal rate and regular rhythm.  Exam reveals no gallop and no friction rub.    Murmur heard.  Pulmonary/Chest: Effort normal and breath sounds normal. She has no wheezes. She has no rales.   Musculoskeletal: She exhibits no edema or tenderness.   Neurological: She is alert and oriented to person, place, and time. She displays normal reflexes.   Skin: Skin is warm and dry. No rash noted. She is not diaphoretic.   Psychiatric: She has a normal mood and affect. Her behavior is normal. Judgment and thought content normal.   Nursing note and vitals reviewed.              Assessment/Plan:     1. Chronic systolic congestive heart failure (CMS-HCC)  It is unclear what patient's medications are currently and whether they were changed at her recent hospitalization. We did request records but nothing was received by time of visit. Patient does have home health coming out and I will have them do lab work on her until we get results. She is on Lasix and potassium as well as an ACE inhibitor.  - COMP METABOLIC PANEL; Future  - CBC WITHOUT DIFFERENTIAL; Future    2. Bilateral leg edema  No increased swelling since last seen and records show she is supposed to be on Lasix 20 mg daily with potassium.    3. Hypothyroidism due to acquired atrophy of thyroid  It is unclear whether patient is using the correct dosage of her " medication. We are trying to get records.  - TSH; Future    4. Essential hypertension  Blood pressure fairly well controlled on current medicines.

## 2017-04-24 NOTE — MR AVS SNAPSHOT
"        Peggy Richardson   2017 2:00 PM   Office Visit   MRN: [id number]    Department:  [address] [place] [address]   Dept Phone:  [phone]    Description:  Female : 1921   Provider:  RALF Augustine           Reason for Visit     Hospital Follow-up [facility]       Allergies as of 2017     Allergen Noted Reactions    Fosamax 2012       GI upset      You were diagnosed with     Chronic systolic congestive heart failure (CMS-HCC)   [526862]       Bilateral leg edema   [064507]       Hypothyroidism due to acquired atrophy of thyroid   [8405703]         Vital Signs     Blood Pressure Pulse Temperature Respirations Height Weight    144/72 mmHg 67 36.8 °C (98.2 °F) 16 1.651 m (5' 5\") 57.607 kg (127 lb)    Body Mass Index Oxygen Saturation Smoking Status             21.13 kg/m2 98% Former Smoker         Basic Information     Date Of Birth Sex Race Ethnicity Preferred Language    1921 Female White Non- English      Your appointments     May 17, 2017 10:00 AM   CARE MANAGEMENT OUTREACH with Ifrah Osborn R.N.   [facility] (--)    [address] [place] [id number]   [phone]           ***IMPORTANT**** This is a phone visit only. Do not come into the office. The Care Manager will call you at the scheduled time for Care Manager Telephone Visit.            May 19, 2017  3:20 PM   Established Patient with Dakotah Alcala M.D.   [facility] [address]    [address] [place] [id number] [phone]           You will be receiving a confirmation call a few days before your appointment from our automated call confirmation system.              Problem List              ICD-10-CM Priority Class Noted - Resolved    Incontinence R32   Unknown - Present    DJD (degenerative joint disease) M19.90   Unknown - Present    PVD (peripheral vascular disease) (CMS-HCC) I73.9   Unknown - Present    Vitamin B12 deficiency E53.8   5/15/2012 - Present   " Medical home Z78.9   2/5/2016 - Present    Essential hypertension I10   1/31/2017 - Present    Chronic systolic congestive heart failure (CMS-HCC) I50.22   1/31/2017 - Present    Bilateral leg edema R60.0   1/31/2017 - Present    Hypothyroidism due to acquired atrophy of thyroid E03.4   4/24/2017 - Present      Health Maintenance        Date Due Completion Dates    IMM DTaP/Tdap/Td Vaccine (1 - Tdap) 12/8/1940 ---    IMM ZOSTER VACCINE 12/8/1981 ---    IMM PNEUMOCOCCAL 65+ (ADULT) LOW/MEDIUM RISK SERIES (1 of 2 - PCV13) 12/8/1986 ---    BONE DENSITY 6/10/2019 6/10/2014 (Declined), 4/23/2014 (Postponed), 4/22/2008, 4/22/2008    Override on 6/10/2014: Patient Declined    Override on 4/23/2014: Postponed    COLONOSCOPY 12/11/2022 12/11/2012 (Declined)    Override on 12/11/2012: Patient Declined            Current Immunizations     Influenza Vaccine Adult HD 11/19/2016, 10/26/2015  4:28 PM, 10/15/2014, 9/24/2013    Influenza Vaccine Pediatric 12/21/2009    Pneumococcal Vaccine (UF)Historical Data 10/1/2007      Below and/or attached are the medications your provider expects you to take. Review all of your home medications and newly ordered medications with your provider and/or pharmacist. Follow medication instructions as directed by your provider and/or pharmacist. Please keep your medication list with you and share with your provider. Update the information when medications are discontinued, doses are changed, or new medications (including over-the-counter products) are added; and carry medication information at all times in the event of emergency situations     Allergies:  FOSAMAX - (reactions not documented)               Medications  Valid as of: April 24, 2017 -  2:25 PM    Generic Name Brand Name Tablet Size Instructions for use    AmLODIPine Besylate (Tab) NORVASC 10 MG TAKE 1 TABLET DAILY        Atenolol (Tab) TENORMIN 50 MG TAKE 1 TABLET TWICE A DAY        Calcium Carbonate-Vitamin D (Tab) Calcium  Carbonate-Vitamin D 600-400 MG-UNIT Take 1 Tab by mouth 2 times a day.        Cyanocobalamin (Cap) B-12 1000 MCG Take  by mouth.        Furosemide (Tab) LASIX 20 MG Take 1 Tab by mouth every day.        Hydrocodone-Acetaminophen (Tab) NORCO 7.5-325 MG Take 1 Tab by mouth every 8 hours as needed (severe pain).        Levothyroxine Sodium (Tab) SYNTHROID 125 MCG Take 1 Tab by mouth Every morning on an empty stomach. Indications: Underactive Thyroid        Lisinopril (Tab) PRINIVIL 20 MG Take 1 Tab by mouth every day.        Meloxicam (Tab) MOBIC 7.5 MG Take 1 Tab by mouth every day.        Pioglitazone HCl (Tab) ACTOS 15 MG TAKE 1 TABLET DAILY        Potassium Chloride (Cap CR) MICRO-K 8 MEQ TAKE 1 CAPSULE BY MOUTH DAILY ***GENERIC FOR MICRO-K*        Tolterodine Tartrate (CAPSULE SR 24 HR) DETROL LA 4 MG TAKE 1 CAPSULE DAILY        .                 Medicines prescribed today were sent to:     [id number] - [place], [place] - [address] [address]    Phone: [phone] Fax: [phone]    Open 24 Hours?: No    [facility] - [place] [address]    Phone: [phone] Fax: [phone]    Open 24 Hours?: No    [facility] - [place] [address]    Phone: [phone] Fax: [phone]    Open 24 Hours?: No      Medication refill instructions:       If your prescription bottle indicates you have medication refills left, it is not necessary to call your provider’s office. Please contact your pharmacy and they will refill your medication.    If your prescription bottle indicates you do not have any refills left, you may request refills at any time through one of the following ways: The online [employer] system (except Urgent Care), by calling your provider’s office, or by asking your pharmacy to contact your provider’s office with a refill  request. Medication refills are processed only during regular business hours and may not be available until the next business day. Your provider may request additional information or to have a follow-up visit with you prior to refilling your medication.   *Please Note: Medication refills are assigned a new Rx number when refilled electronically. Your pharmacy may indicate that no refills were authorized even though a new prescription for the same medication is available at the pharmacy. Please request the medicine by name with the pharmacy before contacting your provider for a refill.        Your To Do List     Future Labs/Procedures Complete By Expires    CBC WITHOUT DIFFERENTIAL  As directed 10/25/2017    COMP METABOLIC PANEL  As directed 4/25/2018    TSH  As directed 4/25/2018      Other Notes About Your Plan     Patient is enrolled in [facility] with Dr. Skinner.    7/19/16--Enrolled in Remote Telemonitoring Program for CHF           Kayliehart Status: Patient Declined

## 2017-04-25 ENCOUNTER — TELEPHONE (OUTPATIENT)
Dept: MEDICAL GROUP | Facility: MEDICAL CENTER | Age: 82
End: 2017-04-25

## 2017-04-25 NOTE — TELEPHONE ENCOUNTER
1. Caller Name: [facility]                                         Call Back Number: [phone]      Patient approves a detailed voicemail message: N\A    Mary called for the patient she stated that the patient gained 6 pounds over night using the [facility] scale and that the patient has shortness of breath with activities, I asked if the patient wanted to come in to see the doctor but she stated that the patient has no desire to be seen, Patient does have an up coming appointment on 5/19 ? Please advise

## 2017-04-26 LAB
ALBUMIN SERPL-MCNC: 2.9 G/DL (ref 3.2–4.6)
ALBUMIN/GLOB SERPL: 0.9 {RATIO} (ref 1.2–2.2)
ALP SERPL-CCNC: 90 IU/L (ref 39–117)
ALT SERPL-CCNC: 9 IU/L (ref 0–32)
AMBIG ABBREV CMP14 DFLT   977206: NORMAL
AST SERPL-CCNC: 18 IU/L (ref 0–40)
BASOPHILS # BLD AUTO: 0 X10E3/UL (ref 0–0.2)
BASOPHILS NFR BLD AUTO: 0 %
BILIRUB SERPL-MCNC: 0.4 MG/DL (ref 0–1.2)
BUN SERPL-MCNC: 18 MG/DL (ref 10–36)
BUN/CREAT SERPL: 18 (ref 12–28)
CALCIUM SERPL-MCNC: 8.4 MG/DL (ref 8.7–10.3)
CHLORIDE SERPL-SCNC: 105 MMOL/L (ref 96–106)
CO2 SERPL-SCNC: 25 MMOL/L (ref 18–29)
CREAT SERPL-MCNC: 1 MG/DL (ref 0.57–1)
EOSINOPHIL # BLD AUTO: 0.1 X10E3/UL (ref 0–0.4)
EOSINOPHIL NFR BLD AUTO: 2 %
ERYTHROCYTE [DISTWIDTH] IN BLOOD BY AUTOMATED COUNT: 16.1 % (ref 12.3–15.4)
GLOBULIN SER CALC-MCNC: 3.3 G/DL (ref 1.5–4.5)
GLUCOSE SERPL-MCNC: 110 MG/DL (ref 65–99)
HCT VFR BLD AUTO: 36 % (ref 34–46.6)
HGB BLD-MCNC: 11.8 G/DL (ref 11.1–15.9)
IMM GRANULOCYTES # BLD: 0 X10E3/UL (ref 0–0.1)
IMM GRANULOCYTES NFR BLD: 0 %
IMMATURE CELLS  115398: ABNORMAL
LYMPHOCYTES # BLD AUTO: 1.6 X10E3/UL (ref 0.7–3.1)
LYMPHOCYTES NFR BLD AUTO: 31 %
MCH RBC QN AUTO: 33.3 PG (ref 26.6–33)
MCHC RBC AUTO-ENTMCNC: 32.8 G/DL (ref 31.5–35.7)
MCV RBC AUTO: 102 FL (ref 79–97)
MONOCYTES # BLD AUTO: 0.4 X10E3/UL (ref 0.1–0.9)
MONOCYTES NFR BLD AUTO: 8 %
MORPHOLOGY BLD-IMP: ABNORMAL
NEUTROPHILS # BLD AUTO: 3 X10E3/UL (ref 1.4–7)
NEUTROPHILS NFR BLD AUTO: 59 %
NRBC BLD AUTO-RTO: ABNORMAL %
PLATELET # BLD AUTO: 209 X10E3/UL (ref 150–379)
POTASSIUM SERPL-SCNC: 4.5 MMOL/L (ref 3.5–5.2)
PROT SERPL-MCNC: 6.2 G/DL (ref 6–8.5)
RBC # BLD AUTO: 3.54 X10E6/UL (ref 3.77–5.28)
SODIUM SERPL-SCNC: 143 MMOL/L (ref 134–144)
TSH SERPL DL<=0.005 MIU/L-ACNC: 13.67 UIU/ML (ref 0.45–4.5)
WBC # BLD AUTO: 5.1 X10E3/UL (ref 3.4–10.8)

## 2017-04-26 NOTE — TELEPHONE ENCOUNTER
I spoke with Nicole from [facility] and she stated that the patient daughter is coming to take the patient to the ER.

## 2017-04-26 NOTE — TELEPHONE ENCOUNTER
[facility] called and stated that the patient was not feeling well and that they were going to have someone take the patient to UC, they also stated that the patient is taking Lasix? Please advise

## 2017-05-01 ENCOUNTER — TELEPHONE (OUTPATIENT)
Dept: MEDICAL GROUP | Facility: MEDICAL CENTER | Age: 82
End: 2017-05-01

## 2017-05-01 DIAGNOSIS — M54.50 CHRONIC MIDLINE LOW BACK PAIN WITHOUT SCIATICA: ICD-10-CM

## 2017-05-01 DIAGNOSIS — G89.29 CHRONIC MIDLINE LOW BACK PAIN WITHOUT SCIATICA: ICD-10-CM

## 2017-05-01 RX ORDER — HYDROCODONE BITARTRATE AND ACETAMINOPHEN 7.5; 325 MG/1; MG/1
1 TABLET ORAL EVERY 8 HOURS PRN
Qty: 100 TAB | Refills: 0 | Status: SHIPPED | OUTPATIENT
Start: 2017-05-01 | End: 2017-05-12 | Stop reason: SDUPTHER

## 2017-05-01 NOTE — TELEPHONE ENCOUNTER
1. Caller Name: [facility]                                            Call Back Number: [phone]        Patient approves a detailed voicemail message: N\A    aMry stated that the patient is on services with [facility], and will no longer require PT, Mary  Physical Therapist stated that the patient has been in and out of the hospital and was not able to fill her medication for hydrocodone-acetaminophen  is out of her pain medication, Mary stated that the prescription is out dated and wanted to know if a different prescription can be issued and if a family member can go by the office and  the medication for the patient? Please advise

## 2017-05-09 ENCOUNTER — TELEPHONE (OUTPATIENT)
Dept: MEDICAL GROUP | Facility: MEDICAL CENTER | Age: 82
End: 2017-05-09

## 2017-05-09 NOTE — TELEPHONE ENCOUNTER
1. Caller Name: Judith From [facility]                                   Call Back Number: [phone]        Patient approves a detailed voicemail message: N\A    Judith called for the patient she stated that the patient is having a hard time breathing, short of breath, Judith also stated that Dr. Valderrama, place unna boots on the patient last week, Judith stated that per Dr. Valderrama she removed the unna boots, because it was to much pressure for the patients legs. I suggested that the patient should be seen at the ER if she is having trouble breathing, Judith stated that they were trying to find someone to take the patient to the ER.

## 2017-05-12 ENCOUNTER — OFFICE VISIT (OUTPATIENT)
Dept: MEDICAL GROUP | Facility: MEDICAL CENTER | Age: 82
End: 2017-05-12
Payer: MEDICARE

## 2017-05-12 VITALS
OXYGEN SATURATION: 96 % | SYSTOLIC BLOOD PRESSURE: 94 MMHG | TEMPERATURE: 98.6 F | DIASTOLIC BLOOD PRESSURE: 60 MMHG | RESPIRATION RATE: 14 BRPM | HEART RATE: 107 BPM | HEIGHT: 65 IN

## 2017-05-12 DIAGNOSIS — R32 URINARY INCONTINENCE, UNSPECIFIED TYPE: ICD-10-CM

## 2017-05-12 DIAGNOSIS — G89.29 CHRONIC MIDLINE LOW BACK PAIN WITHOUT SCIATICA: ICD-10-CM

## 2017-05-12 DIAGNOSIS — I50.22 CHRONIC SYSTOLIC CONGESTIVE HEART FAILURE (HCC): ICD-10-CM

## 2017-05-12 DIAGNOSIS — R60.0 BILATERAL LEG EDEMA: ICD-10-CM

## 2017-05-12 DIAGNOSIS — I10 ESSENTIAL HYPERTENSION: ICD-10-CM

## 2017-05-12 DIAGNOSIS — E03.4 HYPOTHYROIDISM DUE TO ACQUIRED ATROPHY OF THYROID: ICD-10-CM

## 2017-05-12 DIAGNOSIS — M54.50 CHRONIC MIDLINE LOW BACK PAIN WITHOUT SCIATICA: ICD-10-CM

## 2017-05-12 PROCEDURE — 99215 OFFICE O/P EST HI 40 MIN: CPT | Mod: 25 | Performed by: FAMILY MEDICINE

## 2017-05-12 PROCEDURE — 1101F PT FALLS ASSESS-DOCD LE1/YR: CPT | Performed by: FAMILY MEDICINE

## 2017-05-12 PROCEDURE — 1036F TOBACCO NON-USER: CPT | Performed by: FAMILY MEDICINE

## 2017-05-12 PROCEDURE — 4040F PNEUMOC VAC/ADMIN/RCVD: CPT | Mod: 8P | Performed by: FAMILY MEDICINE

## 2017-05-12 PROCEDURE — G8420 CALC BMI NORM PARAMETERS: HCPCS | Performed by: FAMILY MEDICINE

## 2017-05-12 RX ORDER — HYDROXYZINE HYDROCHLORIDE 25 MG/1
12.5-5 TABLET, FILM COATED ORAL EVERY 8 HOURS PRN
Qty: 90 TAB | Refills: 1 | Status: SHIPPED | OUTPATIENT
Start: 2017-05-12

## 2017-05-12 RX ORDER — LISINOPRIL 10 MG/1
10 TABLET ORAL DAILY
COMMUNITY

## 2017-05-12 RX ORDER — METHOCARBAMOL 500 MG/1
500 TABLET, FILM COATED ORAL EVERY 8 HOURS PRN
COMMUNITY

## 2017-05-12 RX ORDER — LEVOTHYROXINE SODIUM 0.15 MG/1
137 TABLET ORAL
Qty: 90 TAB | Refills: 1 | Status: SHIPPED | OUTPATIENT
Start: 2017-05-12

## 2017-05-12 RX ORDER — POTASSIUM CHLORIDE 750 MG/1
10 TABLET, EXTENDED RELEASE ORAL DAILY
COMMUNITY

## 2017-05-12 RX ORDER — HYDROCODONE BITARTRATE AND ACETAMINOPHEN 7.5; 325 MG/1; MG/1
1 TABLET ORAL EVERY 8 HOURS PRN
Qty: 100 TAB | Refills: 0 | Status: SHIPPED | OUTPATIENT
Start: 2017-05-30

## 2017-05-12 RX ORDER — LEVOTHYROXINE SODIUM 137 UG/1
137 TABLET ORAL
COMMUNITY
End: 2017-05-12 | Stop reason: SDUPTHER

## 2017-05-12 NOTE — Clinical Note
[facility]  Dakotah Alcala M.D.  [address]  [place] [address]  Fax: [phone]   Authorization for Release/Disclosure of   Protected Health Information   Name: PEGGY ADAMS : 1921 SSN: [id number]   Address: [address] [place] [address] [address] [place] [address] Phone:    [phone] (home)    I authorize the entity listed below to release/disclose the PHI below to:   [facility]/Dakotah Alcala M.D. and Dakotah Alcala M.D.   Provider or Entity Name:  [facility], [facility], [facility]   Phone:      Fax:     Reason for request: continuity of care   Information to be released: hospitalization records for the past 6 months   [  ] LAST COLONOSCOPY,  including any PATH REPORT and follow-up  [  ] LAST FIT/COLOGUARD RESULT [  ] LAST DEXA  [  ] LAST MAMMOGRAM  [  ] LAST PAP  [  ] LAST LABS [  ] RETINA EXAM REPORT  [  ] IMMUNIZATION RECORDS  [  ] Release all info      [  ] Check here and initial the line next to each item to release ALL health information INCLUDING  _____ Care and treatment for drug and / or alcohol abuse  _____ HIV testing, infection status, or AIDS  _____ Genetic Testing    DATES OF SERVICE OR TIME PERIOD TO BE DISCLOSED: _____________  I understand and acknowledge that:  * This Authorization may be revoked at any time by you in writing, except if your health information has already been used or disclosed.  * Your health information that will be used or disclosed as a result of you signing this authorization could be re-disclosed by the recipient. If this occurs, your re-disclosed health information may no longer be protected by State or Federal laws.  * You may refuse to sign this Authorization. Your refusal will not affect your ability to obtain treatment.  * This Authorization becomes effective upon signing and will  on (date) __________.      If no date is indicated, this Authorization will  one (1) year from the signature date.    Name: Peggy MERRILL  Dylan    Signature:   Date:     5/12/2017       PLEASE FAX REQUESTED RECORDS BACK TO: [phone]

## 2017-05-12 NOTE — MR AVS SNAPSHOT
"        Peggy Richardson   2017 3:40 PM   Office Visit   MRN: [id number]    Department:  [address] [place] [address]   Dept Phone:  [phone]    Description:  Female : 1921   Provider:  Dakotah Alcala M.D.           Allergies as of 2017     Allergen Noted Reactions    Fosamax 2012       GI upset      You were diagnosed with     Chronic systolic congestive heart failure (CMS-HCC)   [715444]       Essential hypertension   [2143477]       Hypothyroidism due to acquired atrophy of thyroid   [0458419]       Chronic midline low back pain without sciatica   [1767236]       Bilateral leg edema   [327120]         Vital Signs     Blood Pressure Pulse Temperature Respirations Height Oxygen Saturation    94/60 mmHg 107 37 °C (98.6 °F) 14 1.651 m (5' 5\") 96%    Smoking Status                   Former Smoker           Basic Information     Date Of Birth Sex Race Ethnicity Preferred Language    1921 Female White Non- English      Your appointments     May 17, 2017 10:00 AM   CARE MANAGEMENT OUTREACH with Ifrah Osborn R.N.   [facility] (--)    [address] [place] [id number] [phone]           ***IMPORTANT**** This is a phone visit only. Do not come into the office. The Care Manager will call you at the scheduled time for Care Manager Telephone Visit.            May 19, 2017  3:20 PM   Established Patient with Dakotah Alcala M.D.   [facility] [address]    [address] [facility] [address]  [facility] [id number] [phone]           You will be receiving a confirmation call a few days before your appointment from our automated call confirmation system.              Problem List              ICD-10-CM Priority Class Noted - Resolved    Incontinence R32   Unknown - Present    DJD (degenerative joint disease) M19.90   Unknown - Present    PVD (peripheral vascular disease) (CMS-HCC) I73.9   Unknown - Present    Vitamin B12 deficiency E53.8   5/15/2012 - Present   "    Medical home Z78.9   2/5/2016 - Present    Essential hypertension I10   1/31/2017 - Present    Chronic systolic congestive heart failure (CMS-HCC) I50.22   1/31/2017 - Present    Bilateral leg edema R60.0   1/31/2017 - Present    Hypothyroidism due to acquired atrophy of thyroid E03.4   4/24/2017 - Present      Health Maintenance        Date Due Completion Dates    IMM DTaP/Tdap/Td Vaccine (1 - Tdap) 12/8/1940 ---    IMM ZOSTER VACCINE 12/8/1981 ---    IMM PNEUMOCOCCAL 65+ (ADULT) LOW/MEDIUM RISK SERIES (1 of 2 - PCV13) 12/8/1986 ---    BONE DENSITY 6/10/2019 6/10/2014 (Declined), 4/23/2014 (Postponed), 4/22/2008, 4/22/2008    Override on 6/10/2014: Patient Declined    Override on 4/23/2014: Postponed    COLONOSCOPY 12/11/2022 12/11/2012 (Declined)    Override on 12/11/2012: Patient Declined            Current Immunizations     Influenza Vaccine Adult HD 11/19/2016, 10/26/2015  4:28 PM, 10/15/2014, 9/24/2013    Influenza Vaccine Pediatric 12/21/2009    Pneumococcal Vaccine (UF)Historical Data 10/1/2007      Below and/or attached are the medications your provider expects you to take. Review all of your home medications and newly ordered medications with your provider and/or pharmacist. Follow medication instructions as directed by your provider and/or pharmacist. Please keep your medication list with you and share with your provider. Update the information when medications are discontinued, doses are changed, or new medications (including over-the-counter products) are added; and carry medication information at all times in the event of emergency situations     Allergies:  FOSAMAX - (reactions not documented)               Medications  Valid as of: May 12, 2017 -  4:30 PM    Generic Name Brand Name Tablet Size Instructions for use    Calcium   Take 500 mg by mouth.        Cholecalciferol (Tab) cholecalciferol 1000 UNIT Take 2,000 Units by mouth every day.        Cyanocobalamin (Cap) B-12 1000 MCG Take  by mouth.         Furosemide (Tab) LASIX 20 MG Take 1 Tab by mouth every day.        Hydrocodone-Acetaminophen (Tab) NORCO 7.5-325 MG Take 1 Tab by mouth every 8 hours as needed for Severe Pain (severe pain).        HydrOXYzine HCl (Tab) ATARAX 25 MG Take 0.5-2 Tabs by mouth every 8 hours as needed for Anxiety.        Levothyroxine Sodium (Tab) SYNTHROID 150 MCG Take 1 Tab by mouth Every morning on an empty stomach.        Lisinopril (Tab) PRINIVIL 10 MG Take 10 mg by mouth every day.        Meloxicam (Tab) MOBIC 7.5 MG Take 1 Tab by mouth every day.        Methocarbamol (Tab) ROBAXIN 500 MG Take 500 mg by mouth every 8 hours as needed (for muscle spasms).        Metoprolol Tartrate (Tab) LOPRESSOR 25 MG Take 12.5 mg by mouth 2 times a day.        Multiple Vitamin (Tab) THERAGRAN  Take 1 Tab by mouth every day.        Potassium Chloride Sharon CR (Tab CR) K-DUR 10 MEQ Take 10 mEq by mouth every day.        Tolterodine Tartrate (CAPSULE SR 24 HR) DETROL LA 4 MG TAKE 1 CAPSULE DAILY        .                 Medicines prescribed today were sent to:     [id number] - [place], [place] - [address] [facility]    [address] [place] [address]    Phone: [phone] Fax: [phone]    Open 24 Hours?: No    [facility] - [place], [place] - [address] [place] [address]    [address] [place] [address]    Phone: [phone] Fax: [phone]    Open 24 Hours?: No      Medication refill instructions:       If your prescription bottle indicates you have medication refills left, it is not necessary to call your provider’s office. Please contact your pharmacy and they will refill your medication.    If your prescription bottle indicates you do not have any refills left, you may request refills at any time through one of the following ways: The online [employer] system (except Urgent Care), by calling your provider’s office, or by asking your pharmacy to contact your provider’s office with a refill request. Medication refills are  processed only during regular business hours and may not be available until the next business day. Your provider may request additional information or to have a follow-up visit with you prior to refilling your medication.   *Please Note: Medication refills are assigned a new Rx number when refilled electronically. Your pharmacy may indicate that no refills were authorized even though a new prescription for the same medication is available at the pharmacy. Please request the medicine by name with the pharmacy before contacting your provider for a refill.        Other Notes About Your Plan     Patient is enrolled in [facility] with Dr. Skinner.    7/19/16--Enrolled in Remote Telemonitoring Program for CHF           Endgame Access Code: [email or web address]  Expires: 6/11/2017  4:30 PM    Endgame  A secure, online tool to manage your health information     [employer]’s Endgame® is a secure, online tool that connects you to your personalized health information from the privacy of your home -- day or night - making it very easy for you to manage your healthcare. Once the activation process is completed, you can even access your medical information using the Endgame bridget, which is available for free in the Apple Bridget store or Google Play store.     Endgame provides the following levels of access (as shown below):   My Chart Features   [facility] Primary Care Doctor [facility]  Specialists [facility]  Urgent  Care Non-[facility]  Primary Care  Doctor   Email your healthcare team securely and privately 24/7 X X X    Manage appointments: schedule your next appointment; view details of past/upcoming appointments X      Request prescription refills. X      View recent personal medical records, including lab and immunizations X X X X   View health record, including health history, allergies, medications X X X X   Read reports about your outpatient visits, procedures, consult and ER notes X X X X   See your discharge summary, which is a  recap of your hospital and/or ER visit that includes your diagnosis, lab results, and care plan. X X       How to register for [employer]:  1. Go to  [email or web address].  2. Click on the Sign Up Now box, which takes you to the New Member Sign Up page. You will need to provide the following information:  a. Enter your [employer] Access Code exactly as it appears at the top of this page. (You will not need to use this code after you’ve completed the sign-up process. If you do not sign up before the expiration date, you must request a new code.)   b. Enter your date of birth.   c. Enter your home email address.   d. Click Submit, and follow the next screen’s instructions.  3. Create a [employer] ID. This will be your [employer] login ID and cannot be changed, so think of one that is secure and easy to remember.  4. Create a [employer] password. You can change your password at any time.  5. Enter your Password Reset Question and Answer. This can be used at a later time if you forget your password.   6. Enter your e-mail address. This allows you to receive e-mail notifications when new information is available in [employer]tech.  7. Click Sign Up. You can now view your health information.    For assistance activating your [employer] account, call [phone]

## 2017-05-12 NOTE — PROGRESS NOTES
cc:  Shortness of breath    Subjective:     Peggy Richardson is a 89 y.o. female presenting with her daughter, Haleigh, for hospice evaluation.  She reports that she feels short of breath most days, but has improved since her hospitalization several weeks ago where they drained fluid from her and diuresed her.  She went to the ER recently with shortness of breath, and work up was unremarkable.  She feels short of breath when she feels anxious or upset.  Using oxygen seems to help.  She wears oxygen at night normally.  She brings in all her medication bottles.  They have discussed hospice with her home health nurse and would like the referral.  They have an appt on Monday with the hospice group.    Review of systems:  See above.          Current outpatient prescriptions:   •  lisinopril (PRINIVIL) 10 MG Tab, Take 10 mg by mouth every day., Disp: , Rfl:   •  potassium chloride SA (K-DUR) 10 MEQ Tab CR, Take 10 mEq by mouth every day., Disp: , Rfl:   •  metoprolol (LOPRESSOR) 25 MG Tab, Take 12.5 mg by mouth 2 times a day., Disp: , Rfl:   •  methocarbamol (ROBAXIN) 500 MG Tab, Take 500 mg by mouth every 8 hours as needed (for muscle spasms)., Disp: , Rfl:   •  multivitamin (THERAGRAN) Tab, Take 1 Tab by mouth every day., Disp: , Rfl:   •  CALCIUM PO, Take 500 mg by mouth., Disp: , Rfl:   •  vitamin D (CHOLECALCIFEROL) 1000 UNIT Tab, Take 2,000 Units by mouth every day., Disp: , Rfl:   •  [START ON 5/30/2017] hydrocodone-acetaminophen (NORCO) 7.5-325 MG per tablet, Take 1 Tab by mouth every 8 hours as needed for Severe Pain (severe pain)., Disp: 100 Tab, Rfl: 0  •  hydrOXYzine (ATARAX) 25 MG Tab, Take 0.5-2 Tabs by mouth every 8 hours as needed for Anxiety., Disp: 90 Tab, Rfl: 1  •  levothyroxine (SYNTHROID) 150 MCG Tab, Take 1 Tab by mouth Every morning on an empty stomach., Disp: 90 Tab, Rfl: 1  •  DETROL LA 4 MG CAPSULE SR 24 HR, TAKE 1 CAPSULE DAILY, Disp: 90 Cap, Rfl: 1  •  furosemide (LASIX) 20 MG Tab, Take 1 Tab by  "mouth every day., Disp: 90 Tab, Rfl: 0  •  meloxicam (MOBIC) 7.5 MG Tab, Take 1 Tab by mouth every day., Disp: 90 Tab, Rfl: 1  •  Cyanocobalamin (B-12) 1000 MCG Cap, Take  by mouth., Disp: , Rfl:     Allergies   Allergen Reactions   • Fosamax      GI upset       Past Medical History   Diagnosis Date   • HTN (hypertension), benign    • Incontinence    • PVD (peripheral vascular disease) (CMS-HCC)    • DJD (degenerative joint disease)    • GERD (gastroesophageal reflux disease)    • Medical home      Past Surgical History   Procedure Laterality Date   • Tonsillectomy     • Gastrectomy       partial   • Cholecystectomy       Family History   Problem Relation Age of Onset   • Cancer Mother      Social History     Social History   • Marital Status:      Spouse Name: N/A   • Number of Children: N/A   • Years of Education: N/A     Occupational History   • Not on file.     Social History Main Topics   • Smoking status: Former Smoker -- 1.50 packs/day     Types: Cigarettes     Quit date: 07/21/1971   • Smokeless tobacco: Former User      Comment: 35 yrs ago   • Alcohol Use: No   • Drug Use: No   • Sexual Activity: Not Currently     Other Topics Concern   • Not on file     Social History Narrative       Objective:     Vitals: BP 94/60 mmHg  Pulse 107  Temp(Src) 37 °C (98.6 °F)  Resp 14  Ht 1.651 m (5' 5\")  SpO2 96%  General: Alert, pleasant, NAD, hard of hearing  HEENT: Normocephalic.   Heart: Regular rate and rhythm.  S1 and S2 normal.  Grade 3/6 systolic and possibly early diastolic murmur  Respiratory: Normal respiratory effort.  Clear to auscultation bilaterally.  No rales heard  Abdomen: Non-distended  Skin: Warm, dry  Extremities: pitting leg edema bilaterally to the mid shins  Psych:  Affect/mood is normal, judgement is good, memory is intact, grooming is appropriate.    Assessment/Plan:     Diagnoses and all orders for this visit:    Chronic systolic congestive heart failure (CMS-[facility])  Bilateral leg " edema  Shortness of breath  [facility] get [facility].  Hospice is appropriate, referral paperwork will be faxed later today. Will continue with metoprolol 12.5mg bid, lasix 20mg daily, KCl 10meq daily, and lisinopril 10mg daily.  For the shortness of breath when she gets anxious, we can try hydroxyzine 12.5mg tid prn (ok to increase to 50mg tid if not sedating).  Will defer to hospice if ativan may be more appropriate.  Encouraged pt and akanksha to call if any questions/concerns.  -     hydrOXYzine (ATARAX) 25 MG Tab; Take 0.5-2 Tabs by mouth every 8 hours as needed for Anxiety.    Essential hypertension  BP low today, but pt reports that her BPs are usually in the 140s systolic with home health.  Will continue lisinopril 10mg daily for now, but could consider stopping this if BPs <100/60    Hypothyroidism due to acquired atrophy of thyroid  Last tsh still elevated at 13.6 in 4/2017 despite increased dose of levo to 137mcg.  Will increase dose again today.  Recommend repeat tsh in 2 months if hospice thinks that appropriate  -     levothyroxine (SYNTHROID) 150 MCG Tab; Take 1 Tab by mouth Every morning on an empty stomach.    Chronic midline low back pain without sciatica  Reports the mobic 7.5 mg daily, norco 7.5-325mg tid, and robaxin 500mg q8h prn is managing her pain well.  Discussed risks of norco use with other sedating medications.   -     hydrocodone-acetaminophen (NORCO) 7.5-325 MG per tablet; Take 1 Tab by mouth every 8 hours as needed for Severe Pain (severe pain).    Urinary incontinence, unspecified type  Continue with detrol 4mg daily      We also discussed that she can stop her vitamin d, calcium, multivitamin if she wants.  We also discontinued the pioglitazone as she doesn't have diabetes and this can make HF and leg swelling worse.    Patient was seen for a total of 40 minutes face-to-face, with more than half of the time spent counseling or coordinating care regarding the above mentioned  problems.       Return if symptoms worsen or fail to improve.

## 2017-05-18 ENCOUNTER — PATIENT OUTREACH (OUTPATIENT)
Dept: HEALTH INFORMATION MANAGEMENT | Facility: OTHER | Age: 82
End: 2017-05-18

## 2017-05-18 NOTE — PROGRESS NOTES
Outbound call to [facility] to see if Ms. Richardson is a their patient.  verified that she is. Spoke with patient and informed her I will be discharging her from the CCM program as she is with hospice.

## 2017-05-19 ENCOUNTER — APPOINTMENT (OUTPATIENT)
Dept: MEDICAL GROUP | Facility: MEDICAL CENTER | Age: 82
End: 2017-05-19
Payer: MEDICARE

## 2017-07-24 ENCOUNTER — TELEPHONE (OUTPATIENT)
Dept: MEDICAL GROUP | Facility: MEDICAL CENTER | Age: 82
End: 2017-07-24

## 2017-07-24 NOTE — TELEPHONE ENCOUNTER
1. Caller Name: Enrrique from [facility]                                         Call Back Number: [phone]        Patient approves a detailed voicemail message: N\A    Enrrique called for the patient he wanted to know if you will sign the patients death certificate ? Please advise

## 2018-04-03 NOTE — PROGRESS NOTES
Central Prior Authorization Team   Phone: [phone]      PA Initiation    Medication: rifaximin (XIFAXAN) 550 MG TABS tablet-PA Initiated  Insurance Company: [employer] - Phone [phone] Fax [phone]  Pharmacy Filling the Rx: [id number] [place] - [address]  Filling Pharmacy Phone: [phone]  Filling Pharmacy Fax: [phone]  Start Date: 4/3/2018           Outbound call to patient on [facility] for CHF. Weekly follow up.      Reviewed with  Peggy :           O2 use: 2 lpm via nc         SOB: denies         Cough: denies         Chest pain: denies         Swelling: nothing more than usual         Dizziness: denies         Weakness/Fatigue: yes, tired right now         Reviewed Medications:   Patient is using medications as directed    Patient reports being tired today due to cooking different food yesterday      Interventions/Education:     Educated on energy conservation techniques:    · Simplify your tasks and set realistic goals. Don't think you have to do things the same way you've always done them.  ·  Plan your activities (chores, exercise, and recreation) ahead of time. Space out your activities throughout the day. Do not schedule too many things to do in one day. Do the things that take more energy when you are feeling your best.  ·  If needed, rest before and after activities.  ·  If you become tired during an activity, stop and rest. You might need to finish it on another day or when you feel less tired. Do not plan activities right after a meal. Rest 20 to 30 minutes after each meal.   · Ask for help. Divide tasks among family and friends.   · Get a good night's sleep and elevate your head when sleeping. Be careful not to nap too much during the day or you might not be able to sleep at night.   · Do all of your grooming (shaving, drying your hair, etc.) while sitting. If needed, use devices and tools that assist you such as a walker, shower chair, hand-held shower head, bedside commode, or long-handled tools for dressing (such as a dressing stick, shoe horn, or sock washington).  Y  ·  Avoid extreme physical activity. Do not push, pull, or lift heavy objects (more than 10 pounds) that require you to strain.     Patient verbalized understanding of all education/teaching  provided

## 2023-08-29 ENCOUNTER — DOCUMENTATION (OUTPATIENT)
Dept: HEALTH INFORMATION MANAGEMENT | Facility: OTHER | Age: 88
End: 2023-08-29
Payer: MEDICARE

## 2023-11-13 ENCOUNTER — DOCUMENTATION (OUTPATIENT)
Dept: HEALTH INFORMATION MANAGEMENT | Facility: OTHER | Age: 88
End: 2023-11-13
Payer: MEDICARE

## 2024-03-07 NOTE — MR AVS SNAPSHOT
"        Peggy Richardson   3/10/2017 1:00 PM   Office Visit   MRN: [id number]    Department:  [address] [place] [address]   Dept Phone:  [phone]    Description:  Female : 1921   Provider:  RALF Augustine           Reason for Visit     Hospital Follow-up           Allergies as of 3/10/2017     Allergen Noted Reactions    Fosamax 2012       GI upset      You were diagnosed with     Fall at home, subsequent encounter   [966868]       Hypothyroidism, unspecified type   [1099271]       Hypokalemia   [278815]       Vitamin B12 deficiency   [894911]       Essential hypertension   [7160267]       Osteoarthritis of spine without myelopathy or radiculopathy, unspecified spinal region   [9265683]         Vital Signs     Blood Pressure Pulse Temperature Respirations Height Weight    128/74 mmHg 69 36.6 °C (97.8 °F) 16 1.676 m (5' 5.98\") 57.607 kg (127 lb)    Body Mass Index Oxygen Saturation Smoking Status             20.51 kg/m2 98% Former Smoker         Basic Information     Date Of Birth Sex Race Ethnicity Preferred Language    1921 Female White Non- English      Your appointments     Apr 10, 2017  1:00 PM   Established Patient with Dakotah Alcala M.D.   [facility] [address]    [address] [place] [address]  [facility] [address]   [phone]           You will be receiving a confirmation call a few days before your appointment from our automated call confirmation system.              Problem List              ICD-10-CM Priority Class Noted - Resolved    Incontinence R32   Unknown - Present    DJD (degenerative joint disease) M19.90   Unknown - Present    PVD (peripheral vascular disease) (CMS-[facility]) I73.9   Unknown - Present    Hypothyroidism E03.9   2010 - Present    Vitamin B12 deficiency E53.8   5/15/2012 - Present    Medical home Z78.9   2016 - Present    Essential hypertension I10   2017 - Present    Chronic systolic congestive heart failure (CMS-HCC) " ----- Message from Josette Olivas MD sent at 3/7/2024  3:51 PM CST -----  Results normal. (-) HPV repeat in 5 years   I50.22   1/31/2017 - Present    Bilateral leg edema R60.0   1/31/2017 - Present      Health Maintenance        Date Due Completion Dates    IMM DTaP/Tdap/Td Vaccine (1 - Tdap) [birth year]/8/1940 ---    IMM ZOSTER VACCINE 12/8/1981 ---    IMM PNEUMOCOCCAL 65+ (ADULT) LOW/MEDIUM RISK SERIES (1 of 2 - PCV13) 12/8/1986 ---    BONE DENSITY 6/10/2019 6/10/2014 (Declined), 4/23/2014 (Postponed), 4/22/2008, 4/22/2008    Override on 6/10/2014: Patient Declined    Override on 4/23/2014: Postponed    COLONOSCOPY 12/11/2022 12/11/2012 (Declined)    Override on 12/11/2012: Patient Declined            Current Immunizations     Influenza Vaccine Adult HD 11/19/2016, 10/26/2015  4:28 PM, 10/15/2014, 9/24/2013    Influenza Vaccine Pediatric 12/21/2009    Pneumococcal Vaccine (UF)Historical Data 10/1/2007      Below and/or attached are the medications your provider expects you to take. Review all of your home medications and newly ordered medications with your provider and/or pharmacist. Follow medication instructions as directed by your provider and/or pharmacist. Please keep your medication list with you and share with your provider. Update the information when medications are discontinued, doses are changed, or new medications (including over-the-counter products) are added; and carry medication information at all times in the event of emergency situations     Allergies:  FOSAMAX - (reactions not documented)               Medications  Valid as of: March 10, 2017 -  1:29 PM    Generic Name Brand Name Tablet Size Instructions for use    AmLODIPine Besylate (Tab) NORVASC 10 MG TAKE 1 TABLET DAILY        Atenolol (Tab) TENORMIN 50 MG TAKE 1 TABLET TWICE A DAY        Calcium Carbonate-Vitamin D (Tab) Calcium Carbonate-Vitamin D 600-400 MG-UNIT Take 1 Tab by mouth 2 times a day.        Cyanocobalamin (Cap) B-12 1000 MCG Take  by mouth.        Furosemide (Tab) LASIX 20 MG Take 1 Tab by mouth every day.        Hydrocodone-Acetaminophen (Tab) NORCO  7.5-325 MG Take 1 Tab by mouth every 8 hours as needed (severe pain).        Levothyroxine Sodium (Tab) SYNTHROID 125 MCG Take 1 Tab by mouth Every morning on an empty stomach.        Lisinopril (Tab) PRINIVIL 20 MG Take 1 Tab by mouth every day.        Meloxicam (Tab) MOBIC 7.5 MG Take 1 Tab by mouth every day.        Potassium Chloride (Cap CR) MICRO-K 8 MEQ TAKE 1 CAPSULE BY MOUTH DAILY ***GENERIC FOR MICRO-K*        Tolterodine Tartrate (CAPSULE SR 24 HR) DETROL LA 4 MG TAKE 1 CAPSULE DAILY        .                 Medicines prescribed today were sent to:     [id number] - [place], [place] - [address] [facility]    [address] [place] [address] [place] [address]    Phone: [phone] Fax: [phone]    Open 24 Hours?: No    [facility] - [place] [address]    Phone: [phone] Fax: [phone]    Open 24 Hours?: No    [facility] - [place] [address]    Phone: [phone] Fax: [phone]    Open 24 Hours?: No      Medication refill instructions:       If your prescription bottle indicates you have medication refills left, it is not necessary to call your provider’s office. Please contact your pharmacy and they will refill your medication.    If your prescription bottle indicates you do not have any refills left, you may request refills at any time through one of the following ways: The online [employer] system (except Urgent Care), by calling your provider’s office, or by asking your pharmacy to contact your provider’s office with a refill request. Medication refills are processed only during regular business hours and may not be available until the next business day. Your provider may request additional information or to have a follow-up visit with you prior to refilling your medication.   *Please Note: Medication refills are assigned a new Rx number when refilled  electronically. Your pharmacy may indicate that no refills were authorized even though a new prescription for the same medication is available at the pharmacy. Please request the medicine by name with the pharmacy before contacting your provider for a refill.        Your To Do List     Future Labs/Procedures Complete By Expires    COMP METABOLIC PANEL  As directed 3/11/2018    TSH  As directed 3/11/2018    VITAMIN B12  As directed 3/10/2018      Other Notes About Your Plan     Patient is enrolled in [facility] with Dr. Skinner.    7/19/16--Enrolled in Remote Telemonitoring Program for CHF           MyChart Status: Patient Declined